# Patient Record
(demographics unavailable — no encounter records)

---

## 2025-06-09 NOTE — HISTORY OF PRESENT ILLNESS
[Gradual] : gradual [0] : 0 [5] : 5 [Dull/Aching] : dull/aching [Intermittent] : intermittent [Nothing helps with pain getting better] : Nothing helps with pain getting better [Full time] : Work status: full time [] : no [FreeTextEntry5] : 2/10 r wrist dorsal cyst with pain 6/9 cyst returned [de-identified] : certain activities

## 2025-06-09 NOTE — PROCEDURE
[Right] : of the right [Wrist] : wrist [Pain] : pain [Alcohol] : alcohol [Ethyl Chloride sprayed topically] : ethyl chloride sprayed topically [Sterile technique used] : sterile technique used [Ganglion cyst] : ganglion cyst [Risks, benefits, alternatives discussed / Verbal consent obtained] : the risks benefits, and alternatives have been discussed, and verbal consent was obtained [Cyst aspiration] : cyst aspiration [All ultrasound images have been permanently captured and stored accordingly in our picture archiving and communication system] : All ultrasound images have been permanently captured and stored accordingly in our picture archiving and communication system [Visualization of the needle and placement of injection was performed without complication] : visualization of the needle and placement of injection was performed without complication [Cyst] : cyst